# Patient Record
Sex: FEMALE | Race: WHITE | NOT HISPANIC OR LATINO | URBAN - METROPOLITAN AREA
[De-identification: names, ages, dates, MRNs, and addresses within clinical notes are randomized per-mention and may not be internally consistent; named-entity substitution may affect disease eponyms.]

---

## 2023-08-20 ENCOUNTER — EMERGENCY (EMERGENCY)
Facility: HOSPITAL | Age: 23
LOS: 1 days | Discharge: HOME | End: 2023-08-20
Admitting: STUDENT IN AN ORGANIZED HEALTH CARE EDUCATION/TRAINING PROGRAM
Payer: COMMERCIAL

## 2023-08-20 VITALS
OXYGEN SATURATION: 100 % | SYSTOLIC BLOOD PRESSURE: 107 MMHG | TEMPERATURE: 98 F | HEIGHT: 64 IN | DIASTOLIC BLOOD PRESSURE: 74 MMHG | HEART RATE: 78 BPM | RESPIRATION RATE: 16 BRPM | WEIGHT: 130.07 LBS

## 2023-08-20 PROCEDURE — 99284 EMERGENCY DEPT VISIT MOD MDM: CPT | Mod: 25

## 2023-08-20 PROCEDURE — 10080 I&D PILONIDAL CYST SIMPLE: CPT

## 2023-08-20 RX ORDER — OXYCODONE AND ACETAMINOPHEN 5; 325 MG/1; MG/1
1 TABLET ORAL
Qty: 10 | Refills: 0
Start: 2023-08-20

## 2023-08-20 RX ORDER — IBUPROFEN 200 MG
1 TABLET ORAL
Qty: 21 | Refills: 0
Start: 2023-08-20 | End: 2023-08-26

## 2023-08-20 NOTE — ED PROVIDER NOTE - NSFOLLOWUPINSTRUCTIONS_ED_ALL_ED_FT
Pilonidal Cyst Drainage, Care After  The following information offers guidance on how to care for yourself after your procedure. Your health care provider may also give you more specific instructions. If you have problems or questions, contact your health care provider.    What can I expect after the procedure?  After the procedure, it is common to have:  Pain that gets better when you take medicine.  Some fluid or blood coming from your wound.  Follow these instructions at home:  Medicines    Take over-the-counter and prescription medicines only as told by your health care provider.  If you were prescribed antibiotics, take them as told by your health care provider. Do not stop using the antibiotic even if you start to feel better.  Ask your health care provider if the medicine prescribed to you:  Requires you to avoid driving or using machinery.  Can cause constipation. You may need to take these actions to prevent or treat constipation:  Drink enough fluid to keep your urine pale yellow.  Take over-the-counter or prescription medicines.  Eat foods that are high in fiber, such as beans, whole grains, and fresh fruits and vegetables.  Limit foods that are high in fat and processed sugars, such as fried or sweet foods.  Bathing    Do not take baths or showers, swim, or use a hot tub until your health care provider approves. You may only be allowed to take sponge baths.  When you can return to bathing will depend on the type of wound that you have.  If your wound was packed with a germ-free packing material, keep the area dry until your packing has been removed. After the packing has been removed, you may start taking showers when your health care provider approves.  If the edges of the incision around your wound were stitched to your skin (marsupialization), you may start taking showers the day after surgery, or when your health care provider approves. Let the water from the shower moisten your bandage (dressing). This will make it easier to take off. Remove your dressing before you shower.  While bathing, clean your buttocks area gently with soap and water.  After bathing:  Pat the area dry with a soft, clean towel.  If directed, cover the area with a clean dressing.  Wound care    Two stitched wounds. One is normal. The other is red with pus and infected.  You may need to have a caregiver help you with wound care and dressing changes.  Follow instructions from your health care provider about how to take care of your wound. Make sure you:  Wash your hands with soap and water for at least 20 seconds before and after you change your dressing. If soap and water are not available, use hand .  Change your dressing as told by your health care provider.  Leave stitches (sutures), skin glue, or adhesive strips in place. These skin closures may need to stay in place for 2 weeks or longer. If adhesive strip edges start to loosen and curl up, you may trim the loose edges. Do not remove adhesive strips completely unless your health care provider tells you to do that.  Check your wound every day for signs of infection. Check for:  Redness, swelling, or more pain.  More fluid or blood.  Warmth.  Pus or a bad smell.  Follow any additional instructions from your health care provider on how to care for your wound, such as wound cleaning, wound flushing (irrigation), or packing your wound with a dressing.  If you had marsupialization, ask your health care provider when you can stop using a dressing.  Lifestyle    Do not do activities that irritate or put pressure on your buttocks for about 2 weeks, or as long as told by your health care provider. These activities include bike riding, running, and anything that involves a twisting motion.  Rest as told by your health care provider.  Do not sit for a long time without moving. Get up to take short walks every 1–2 hours. This will improve blood flow and breathing. Ask for help if you feel weak or unsteady.  Sleep on your side instead of your back.  Avoid wearing tight underwear and tight pants.  Return to your normal activities as told by your health care provider. Ask your health care provider what activities are safe for you.  General instructions    Do not use any products that contain nicotine or tobacco. These products include cigarettes, chewing tobacco, and vaping devices, such as e-cigarettes. These can delay wound healing after surgery. If you need help quitting, ask your health care provider.  Keep all follow-up visits. This is important to monitor healing. If you had an incision and drainage procedure with wound packing, your packing may be changed or removed at follow-up visits.  Contact a health care provider if:  You have pain that does not get better with medicine.  You have any of these signs of infection:  More redness, swelling, or pain around your incision.  More fluid or blood coming from your incision.  Warmth coming from your incision.  Pus or a bad smell coming from your incision.  A fever.  You have muscle aches.  You feel generally sick.  You are dizzy.  Summary  A pilonidal cyst is a fluid-filled sac that forms under the skin near the tailbone. It is common to have some fluid or blood coming from your wound after a procedure to drain a pilonidal cyst.  If you were prescribed antibiotics, take them as told by your health care provider. Do not stop taking the antibiotic even if you start to feel better.  You may need to have a caregiver help you with wound care and dressing changes.  Return to your health care provider as instructed to have any packing material changed or removed.  This information is not intended to replace advice given to you by your health care provider. Make sure you discuss any questions you have with your health care provider.

## 2023-08-20 NOTE — ED ADULT NURSE NOTE - NSFALLUNIVINTERV_ED_ALL_ED
Bed/Stretcher in lowest position, wheels locked, appropriate side rails in place/Call bell, personal items and telephone in reach/Instruct patient to call for assistance before getting out of bed/chair/stretcher/Non-slip footwear applied when patient is off stretcher/[place] to call system/Physically safe environment - no spills, clutter or unnecessary equipment/Purposeful proactive rounding/Room/bathroom lighting operational, light cord in reach

## 2023-08-20 NOTE — ED PROVIDER NOTE - OBJECTIVE STATEMENT
21 y/o f presents c/o pilonidal cyst.  Pt stating the area has been getting swollen for the past 5 days or so, opened up slightly yesterday but still feeling swollen.  Denies fever, chills, all other ROS negative.

## 2023-08-20 NOTE — ED PROVIDER NOTE - CLINICAL SUMMARY MEDICAL DECISION MAKING FREE TEXT BOX
21 y/o f presents with pilonidal abscess; I&D done in ED, will d/c on bactrim, pain meds, warm soaks, f/u pmd, return to ED if sx worsen.

## 2023-08-20 NOTE — ED ADULT NURSE NOTE - OBJECTIVE STATEMENT
Pt presented to the ED with complaints of an abscess. Pt has had a pilonidal cyst for the past week and a half, saw a dermatologist and was told it would need to be drained. Pt states yesterday, she was applying warm compress to the abscess and it popped, states pain has been excruciating since the abscess popped.

## 2023-08-20 NOTE — ED PROVIDER NOTE - PATIENT PORTAL LINK FT
You can access the FollowMyHealth Patient Portal offered by [facility] by registering at the following website: [email or web address]. By joining [employer]’s FollowMyHealth portal, you will also be able to view your health information using other applications (apps) compatible with our system.

## 2023-08-22 DIAGNOSIS — L05.01 PILONIDAL CYST WITH ABSCESS: ICD-10-CM
